# Patient Record
Sex: MALE | Race: WHITE
[De-identification: names, ages, dates, MRNs, and addresses within clinical notes are randomized per-mention and may not be internally consistent; named-entity substitution may affect disease eponyms.]

---

## 2017-06-02 ENCOUNTER — HOSPITAL ENCOUNTER (EMERGENCY)
Dept: HOSPITAL 68 - ERH | Age: 52
End: 2017-06-02
Payer: COMMERCIAL

## 2017-06-02 VITALS — BODY MASS INDEX: 27.09 KG/M2 | WEIGHT: 200 LBS | HEIGHT: 72 IN

## 2017-06-02 VITALS — DIASTOLIC BLOOD PRESSURE: 95 MMHG | SYSTOLIC BLOOD PRESSURE: 135 MMHG

## 2017-06-02 DIAGNOSIS — F10.129: Primary | ICD-10-CM

## 2017-06-02 DIAGNOSIS — K70.10: ICD-10-CM

## 2017-06-02 LAB
ABSOLUTE GRANULOCYTE CT: 5.4 /CUMM (ref 1.4–6.5)
BASOPHILS # BLD: 0 /CUMM (ref 0–0.2)
BASOPHILS NFR BLD: 0.5 % (ref 0–2)
EOSINOPHIL # BLD: 0.1 /CUMM (ref 0–0.7)
EOSINOPHIL NFR BLD: 0.9 % (ref 0–5)
ERYTHROCYTE [DISTWIDTH] IN BLOOD BY AUTOMATED COUNT: 13.3 % (ref 11.5–14.5)
GRANULOCYTES NFR BLD: 59.4 % (ref 42.2–75.2)
HCT VFR BLD CALC: 42.4 % (ref 42–52)
LITHIUM SERPL-SCNC: < 0.2 MMOL/L (ref 0.6–1.2)
LYMPHOCYTES # BLD: 2.8 /CUMM (ref 1.2–3.4)
MCH RBC QN AUTO: 34.6 PG (ref 27–31)
MCHC RBC AUTO-ENTMCNC: 34 G/DL (ref 33–37)
MCV RBC AUTO: 101.8 FL (ref 80–94)
MONOCYTES # BLD: 0.8 /CUMM (ref 0.1–0.6)
PLATELET # BLD: 215 /CUMM (ref 130–400)
PMV BLD AUTO: 9.1 FL (ref 7.4–10.4)
RED BLOOD CELL CT: 4.16 /CUMM (ref 4.7–6.1)
WBC # BLD AUTO: 9.1 /CUMM (ref 4.8–10.8)

## 2017-06-02 PROCEDURE — G0480 DRUG TEST DEF 1-7 CLASSES: HCPCS

## 2017-06-02 NOTE — ED PSYCHIATRIC COMPLAINT
History of Present Illness
 
General
Chief Complaint: ETOH/Drug Related Complaint
Stated Complaint: DETOX
Source: patient
Exam Limitations: no limitations
 
Vital Signs & Intake/Output
Vital Signs & Intake/Output
 Vital Signs
 
 
Date Time Temp Pulse Resp B/P B/P Pulse O2 O2 Flow FiO2
 
     Mean Ox Delivery Rate 
 
06/02 1735 96.7 90 18 122/89     
 
06/02 1730 96.7 90 18 122/89  96 Room Air  
 
06/02 1623 97.7 90 18 104/76  94 Room Air  
 
06/02 1403 97.6 88 18 124/76     
 
06/02 1158 96.6 93 18 155/81     
 
06/02 0948 98.0 69 18 124/74  99 Room Air  
 
 
Allergies
Coded Allergies:
NO KNOWN ALLERGIES (06/18/15)
 
Reconcile Medications
Amlodipine Besylate (Norvasc) 5 MG TABLET   1 TAB PO DAILY htn
Lorazepam (Ativan) 1 MG TABLET   1 TAB PO TID PRN ALCHOHOL WITHDRAWAL
     TODAY/TOMORROW: 1 TAB EVERY 6 HOURS
     DAY 3: 1 EVERY 8 HOURS
     DAY 4: 1 EVERY 12 HOURS
     DAY 5: 1 TAB
 
Triage Note:
PT TO ED FOR ETOH DETOX, REPORTING LAST DRINK WAS
1/2 PINT OF WHISKEY AT APPROX 8 AM, ENDRORSES
DRINKING 1-1.5 PINTS OF MARCELL DAILY FOR "ALL MY
LIFE" DENIES HX OF DETOX SEIZURES. PT APPEAR
INTOXICATED IN TRIAGE.
Triage Nurses Notes Reviewed? yes
HPI:
Patient presents for evaluation of alcoholism.  Patient states "I am drunk" with
his last alcohol consumption being 7:30 this morning consisting of 3 beers and a
pint of whiskey.  He states he drinks about 6 beers and 1-1-1/2 pints daily and 
has been "drinking all" his life.  He had one prior detox 2 years ago at 
different hospital.  he denies a history of seizures.
 
Past History
 
Travel History
Traveled to [place] past 21 day No
 
Medical History
Any Pertinent Medical History? see below for history
Neurological: NONE
EENT: NONE
Cardiovascular: NONE
Respiratory: NONE
Gastrointestinal: NONE
Hepatic: NONE
Renal: NONE
Musculoskeletal: NONE
Psychiatric: NONE
Endocrine: NONE
Blood Disorders: NONE
Cancer(s): NONE
GYN/Reproductive: NONE
History of MRSA: No
History of VRE: No
History of CDIFF: No
 
Surgical History
Surgical History: non-contributory
 
Psychosocial History
Who do you live with Other (see notes)
Services at Home None
What is your primary language English
Tobacco Use: Current Daily Use
Daily Tobacco Use Amount/Type: => 5 Cigarettes daily
ETOH Use: alcoholic
Illicit Drug Use: denies illicit drug use
 
Family History
Hx Contributory? No
 
Review of Systems
 
Review of Systems
Constitutional:
Reports: no symptoms. 
EENTM:
Reports: no symptoms. 
Respiratory:
Reports: no symptoms. 
Cardiovascular:
Reports: no symptoms. 
GI:
Reports: no symptoms. 
Genitourinary:
Reports: no symptoms. 
Musculoskeletal:
Reports: no symptoms. 
Skin:
Reports: no symptoms. 
Neurological/Psychological:
Reports: no symptoms. 
Hematologic/Endocrine:
Reports: no symptoms. 
Immunologic/Allergic:
Reports: no symptoms. 
All Other Systems: Reviewed and Negative
 
Physical Exam
 
Physical Exam
General Appearance: SEE BELOW
Neurological/Psychiatric: SEE BELOW
Comments:
General: Alert, calm, cooperative, mild EtOH-like odor
Head: Normocephalic, atraumatic
Eyes: Normal inspection, extinguishing nystagmus with lateral gaze, EOMI
Ears: Normal inspection
Nose: Normal inspection
Throat: Moist mucosa
Neck: Supple, no goiter
Heart: Regular rate and rhythm, no murmurs rubs or gallops
Lungs: Clear to auscultation bilaterally with good air entry
Abdomen: Soft nontender nondistended, normal bowel sounds
Chest: Nontender
Extremities: Normal range of motion grossly, no tremors present, no cyanosis 
clubbing or edema of the upper extremities
Neurologic: cranial nerves II through XII grossly intact, speech clear, gait 
normal
Psychiatric: No apparent delusions or hallucinations, no pressured speech or 
thought blocking
SAD PERSONS Done? patient not suicidal
 
Progress
Differential Diagnosis: drug intoxication, drug overdose, drug withdrawal
Plan of Care:
 Orders
 
 
Procedure Date/time Status
 
Add-on Test (ER Only) 06/02 1232 Active
 
LIPASE 06/02 1109 Complete
 
CIWA 06/02 1048 Active
 
Add-on Test (ER Only) 06/02 1046 Active
 
URINE DRUG SCREEN FOR ER ONLY 06/02 1021 Complete
 
MAGNESIUM 06/02 1021 Complete
 
LITHIUM 06/02 1021 Complete
 
ETHANOL 06/02 1021 Complete
 
COMPREHENSIVE METABOLIC PANEL 06/02 1021 Complete
 
CBC WITHOUT DIFFERENTIAL 06/02 1021 Complete
 
 
 Laboratory Tests
 
 
 
06/02/17 1109:
Lithium < 0.2  L, Serum Alcohol 251.0
 
06/02/17 1109:
Anion Gap 12, Estimated GFR > 60, BUN/Creatinine Ratio 17.1, Glucose 331  H, 
Calcium 8.5, Magnesium 1.7, Total Bilirubin 1.1, AST 76  H,   H, Alkaline
Phosphatase 184  H, Total Protein 6.7, Albumin 3.7, Globulin 3.0, Albumin/
Globulin Ratio 1.2, Lipase 133, CBC w Diff NO MAN DIFF REQ, RBC 4.16  L, MCV 
101.8  H, MCH 34.6  H, RDW 13.3, MPV 9.1, Gran % 59.4, Lymphocytes % 30.6, 
Monocytes % 8.6, Eosinophils % 0.9, Basophils % 0.5, Absolute Granulocytes 5.4, 
Absolute Lymphocytes 2.8, Absolute Monocytes 0.8  H, Absolute Eosinophils 0.1, 
Absolute Basophils 0, PUBS MCHC 34.0, Urine Opiates Screen < 100.00, Methadone 
Screen < 40, Barbiturate Screen < 60, Ur Phencyclidine Scrn < 6.00, Amphetamines
Screen < 100, U Benzodiazepines Scrn < 85, Urine Cocaine Screen < 50, Urine 
Cannabis Screen < 5.00
Comments:
6/2/2017 6:16:11 PM patient appears comfortable with low CIWA scores.  He is 
still somewhat intoxicated and cannot be formally evaluated for inpatient detox 
for another 2 hours or so.  I've explained this to the patient and he is 
respectfully declined further emergency Department treatment.  His on we'll be 
giving him a ride home and he will seek outpatient detox and counseling.  
Clinically this patient appears sober and capable of informed medical decision 
making.
 
Departure
 
Departure
Disposition: HOME OR SELF CARE
Condition: Stable
Clinical Impression
Primary Impression: Alcohol intoxication
Qualifiers:  Complication of substance-induced condition: uncomplicated 
Qualified Code: F10.920 - Alcohol use, unspecified with intoxication, 
uncomplicated
Secondary Impressions: 
Alcoholic hepatitis
Qualifiers:  Ascites presence: without ascites Qualified Code: K70.10 - 
Alcoholic hepatitis without ascites
 
Referrals:
ROSARIO VALENTIN,CONRADO (PCP/Family)
 
Additional Instructions:
Ativan as prescribed if you remain abstinent from alcohol.  Otherwise wean your 
alcohol use and seek an outpatient detox program as soon as possible.  Your 
primary care doctor of this emergency department visit and treatment plan.  Of 
note, you have elevations in your liver enzymes studies consistent with 
alcoholic hepatitis.  If you've refrain from alcohol intake this will resolve on
its own.  Return if any concerns or sudden worsening.
 
How to take the Ativan: you will take one tablet every 6 hours for the remainder
of today and tomorrow.  On third day you will take one tablet every 8 hours, on 
the fourth day you'll take one tablet every 12 hours and on the final day 1 
tablets only.
Departure Forms:
Customer Survey
General Discharge Information
Prescriptions:
Current Visit Scripts
Lorazepam (Ativan) 1 TAB PO TID PRN ALCHOHOL WITHDRAWAL 
     #12 TAB 
     TODAY/TOMORROW: 1 TAB EVERY 6 HOURS
     DAY 3: 1 EVERY 8 HOURS
     DAY 4: 1 EVERY 12 HOURS
     DAY 5: 1 TAB

## 2018-05-11 ENCOUNTER — HOSPITAL ENCOUNTER (INPATIENT)
Dept: HOSPITAL 68 - ERH | Age: 53
LOS: 5 days | DRG: 754 | End: 2018-05-16
Attending: PSYCHIATRY & NEUROLOGY | Admitting: PSYCHIATRY & NEUROLOGY
Payer: COMMERCIAL

## 2018-05-11 VITALS — WEIGHT: 238.25 LBS | HEIGHT: 72 IN | BODY MASS INDEX: 32.27 KG/M2

## 2018-05-11 VITALS — DIASTOLIC BLOOD PRESSURE: 104 MMHG | SYSTOLIC BLOOD PRESSURE: 156 MMHG

## 2018-05-11 VITALS — DIASTOLIC BLOOD PRESSURE: 80 MMHG | SYSTOLIC BLOOD PRESSURE: 141 MMHG

## 2018-05-11 DIAGNOSIS — F32.9: Primary | ICD-10-CM

## 2018-05-11 DIAGNOSIS — E11.8: ICD-10-CM

## 2018-05-11 DIAGNOSIS — F10.10: ICD-10-CM

## 2018-05-11 DIAGNOSIS — E78.5: ICD-10-CM

## 2018-05-11 DIAGNOSIS — I10: ICD-10-CM

## 2018-05-11 LAB
ABSOLUTE GRANULOCYTE CT: 3.6 /CUMM (ref 1.4–6.5)
BASOPHILS # BLD: 0.1 /CUMM (ref 0–0.2)
BASOPHILS NFR BLD: 0.7 % (ref 0–2)
EOSINOPHIL # BLD: 0.3 /CUMM (ref 0–0.7)
EOSINOPHIL NFR BLD: 3.4 % (ref 0–5)
ERYTHROCYTE [DISTWIDTH] IN BLOOD BY AUTOMATED COUNT: 14.1 % (ref 11.5–14.5)
GRANULOCYTES NFR BLD: 44.9 % (ref 42.2–75.2)
HCT VFR BLD CALC: 46 % (ref 42–52)
LYMPHOCYTES # BLD: 3.3 /CUMM (ref 1.2–3.4)
MCH RBC QN AUTO: 34.3 PG (ref 27–31)
MCHC RBC AUTO-ENTMCNC: 33.3 G/DL (ref 33–37)
MCV RBC AUTO: 103 FL (ref 80–94)
MONOCYTES # BLD: 0.8 /CUMM (ref 0.1–0.6)
PLATELET # BLD: 383 /CUMM (ref 130–400)
PMV BLD AUTO: 8.3 FL (ref 7.4–10.4)
RED BLOOD CELL CT: 4.47 /CUMM (ref 4.7–6.1)
WBC # BLD AUTO: 8.1 /CUMM (ref 4.8–10.8)

## 2018-05-11 PROCEDURE — G0463 HOSPITAL OUTPT CLINIC VISIT: HCPCS

## 2018-05-11 PROCEDURE — G0480 DRUG TEST DEF 1-7 CLASSES: HCPCS
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
## 2018-05-11 NOTE — ED PSYCHIATRIC COMPLAINT
History of Present Illness
 
General
Chief Complaint: Psychiatric Related Complaint
Stated Complaint: BIBA +SI
Source: patient
Exam Limitations: poor historian
Allergies
Coded Allergies:
NO KNOWN ALLERGIES (06/18/15)
 
Triage Note:
BIBA ON PEER AFTER TEXTING HIS DAUGHTER THAT HE
 WANTED TO HARM HIMSELF. SHE CALLED 911, AND ON
 ARRIVAL OF PD AND EMS THEY FOUND A LADDER LEANING
 AGAINST THE RAFTERS AND PT ADMITTED TO LOOKING FOR
 A ROPE. DENIES SIGNIFICANT ILLICIT DRUG USE, HX
 ETOH. ARRIVES CALM, COOPERATIVE AND OPEN TO HELP
Triage Nurses Notes Reviewed? yes
Onset: Abrupt
Duration: unknown duration
Timing: recent history
HPI:
53-year-old male was brought into the emergency room by police to [facility] 
after making suicidal comments to his daughter.  Patient was reportedly found 
with a ladder next to the garage and he was actively looking for a rope to hang 
himself.  He reports that he's been increasingly depressed and is currently in 
financial debt.  He reports that he has and insurance policy and is worth more 
tender than alive.  He does not want to live.  He reports that he just wants to 
leave here so he get about his business he denies any alcohol or drug use.  He 
lives with his aunt.
(Reece RAPP,Tamir)
Reconcile Medications
Atorvastatin Calcium 10 MG TABLET   1 TAB PO DAILY CHOLESTEROL  (Reported)
Lisinopril 5 MG TABLET   1 TAB PO DAILY HEART  (Reported)
Metformin HCl 500 MG TABLET   2 TAB PO BID DIABETES  (Reported)
 
(Arslan VALENTIN,Trey RUBIO)
 
Vital Signs & Intake/Output
Vital Signs & Intake/Output
 Vital Signs
 
 
Date Time Temp Pulse Resp B/P B/P Pulse O2 O2 Flow FiO2
 
     Mean Ox Delivery Rate 
 
05/13 1552  91  140/93     
 
05/13 1550  91  140/93     
 
05/13 1230  90  141/97     
 
05/13 1158  90  141/97     
 
05/13 0851  84  139/98     
 
05/13 0751  84  139/98     
 
05/13 0750 96.8 84  139/98     
 
05/12 2159  86  154/98     
 
05/12 2013 97.8 98  143/97     
 
05/12 2007 97.8 98  143/97     
 
 
 ED Intake and Output
 
 
 05/13 0000 05/12 1200
 
Intake Total  
 
Output Total  
 
Balance  
 
   
 
Patient  238 lb
 
Weight  
 
 
 
(Jennifer VALENTIN,Luis ARMSTRONG)
 
Past History
 
Travel History
Traveled to [place] past 21 day No
 
Medical History
Any Pertinent Medical History? see below for history
Neurological: NONE
EENT: NONE
Cardiovascular: NONE
Respiratory: NONE
Gastrointestinal: NONE
Hepatic: NONE
Renal: NONE
Musculoskeletal: NONE
Psychiatric: NONE
Endocrine: NONE
Blood Disorders: NONE
Cancer(s): NONE
GYN/Reproductive: NONE
History of MRSA: No
History of VRE: No
History of CDIFF: No
 
Surgical History
Surgical History: non-contributory
 
Psychosocial History
Who do you live with Other (see notes)
Services at Home None
What is your primary language English
Tobacco Use: Current Daily Use
Daily Tobacco Use Amount/Type: => 5 Cigarettes daily
 
Family History
Hx Contributory? No
(Tamir Magdaleno)
 
Review of Systems
 
Review of Systems
Constitutional:
Reports: no symptoms. 
EENTM:
Reports: no symptoms. 
Respiratory:
Reports: no symptoms. 
Cardiovascular:
Reports: no symptoms. 
GI:
Reports: no symptoms. 
Genitourinary:
Reports: no symptoms. 
Musculoskeletal:
Reports: no symptoms. 
Skin:
Reports: no symptoms. 
Neurological/Psychological:
Reports: see HPI. 
Hematologic/Endocrine:
Reports: no symptoms. 
Immunologic/Allergic:
Reports: no symptoms. 
All Other Systems: Reviewed and Negative
(Tamir Magdaleno)
 
Physical Exam
 
Physical Exam
General Appearance: well developed/nourished, alert, awake
Head: atraumatic
Eyes:
Bilateral: normal appearance. 
Ears, Nose, Throat: normal ENT inspection, hearing grossly normal
Neck: normal inspection
Respiratory: no respiratory distress
Cardiovascular: regular rate/rhythm
Extremities: normal range of motion
Neurological/Psychiatric: alert, calm, flat
Appearance/Memory/Insight: impaired insight
Behavoir/Eye Contact/Speech: cooperative
Thoughts/Hallucinations: no apparent hallucination
Skin: intact, normal color, warm/dry
SAD PERSONS
 
 
SAD PERSONS Response Value
 
Male Sex? yes 1
 
Age <19 or >45 years? yes 1
 
Depression/Hopelessness? yes 2
 
Rational Thinking Loss? yes 2
 
Single//? yes 1
 
Organized/Serious Attempt yes 2
 
Stated Future Intent? yes 2
 
Total   11
 
 
SAD PERSONS Done? yes
(Tamir Magdaleno)
 
Progress
Differential Diagnosis: dementia, drug intoxication, drug overdose, drug 
withdrawal, depression, anxiety, bipolar,
Plan of Care:
 Current Medications
 
 
  Sig/Samia Start time  Last
 
Medication Dose  Stop Time Status Admin
 
Lisinopril 10 MG DAILY 05/13 0900 AC 05/13
 
(Prinivil)     0851
 
Lorazepam 1 MG Q4P PRN 05/13 0845 AC 
 
(Ativan)     
 
Lorazepam 2 MG Q4P PRN 05/13 0845 AC 
 
(Ativan)     
 
Insulin Aspart 0 TIDAC 05/12 1700 AC 
 
(NovoLOG)     
 
Metformin HCl 1,000 MG 0800,1700 05/12 1700 AC 05/13
 
(Glucophage)     1722
 
Acetaminophen 650 MG Q4P PRN 05/12 1230 AC 
 
(Tylenol)     
 
Al Hydroxide/Mg  30 ML Q4-6 PRN PRN 05/12 1230 AC 
 
Hydroxide     
 
(Maalox Plus)     
 
Magnesium Hydroxide 30 ML AT BEDTIME PRN 05/12 1230 AC 
 
(Milk Of Magnesia)     
 
Aspirin Buffered 81 MG DAILY 05/12 1220 AC 05/13
 
(Ecotrin)     0850
 
Multivitamins 1 TAB DAILY 05/12 1213 AC 05/13
 
(Theragran Vitamins)     0851
 
Folic Acid 1 MG DAILY 05/12 1210 AC 05/13
 
(Folic Acid)     0850
 
Thiamine HCl 100 MG DAILY 05/12 1210 AC 05/13
 
(Vitamin B1)     0850
 
Atorvastatin Calcium 10 MG DAILY 05/12 0900 AC 05/13
 
(Lipitor)     0850
 
 
 
Hand-Off
   Endorsed To:
Arcelia Mccarthy MD
(Tamir Magdaleno)
Hand-Off
   Endorsed To:
Luis Rodriguez MD
   Endorsed Time: 0700
   Pending: consult
(Arslan VALENTIN,Trey RUBIO)
Comments:
5/12/2018 7:26:32 AM patient signed out to me by Dr. Mcdaniels at shift change 
over.
(Jennifer VALENTIN,Luis ARMSTRONG)
 
Departure
 
Departure
Condition: Stable
Referrals:
Brenden VALENTIN,Chilo FLORES (PCP/Family)
 
Departure Forms:
Customer Survey
General Discharge Information
Comments
5/11/2018 1:17:06 PM
 
I was just made aware by the charge nurse that the patient reportedly eloped 
from the emergency room through the ambulance bay.  Police were notified and are
looking for the patient.
(Tamir Magdaleno)
 
Departure
Disposition: STILL A PATIENT
 
PA/NP Co-Sign Statement
Statement:
ED Attending supervision documentation-
 
[] I saw and evaluated the patient. I have also reviewed all the pertinent lab 
results and diagnostic results. I agree with the findings and the plan of care 
as documented in the PA's/NP's documentation. 
 
[x] I have reviewed the ED Record and agree with the PA's/NP's documentation.
 
[] Additions or exceptions (if any) to the PAs/NP's note and plan are 
summarized below:
[]
 
(Trey Mcdaniels MD)
 
Departure
Clinical Impression
Primary Impression: Depression
Qualifiers:  Depression Type: unspecified Qualified Code: F32.9 - Major 
depressive disorder, single episode, unspecified
Secondary Impressions: Alcohol use disorder
 
Psych Admission Note
Psychiatric Admission:
I have seen and evaluated LUIS EDUARDO MARTIN.
 
I have also reviewed all the pertinent lab results and diagnostic results.
 
LUIS EDUARDO MARTIN will be admitted to our inpatient Psychiatric unit for 
treatment and care.
 
(Jennifer VALENTIN,Luis ARMSTRONG)
 
 
[] Additions or exceptions (if any) to the PAs/NP's note and plan are 
summarized below:
[]
 
(Trey Mcdaniels MD)
 
Departure
Clinical Impression
Primary Impression: Depression
Qualifiers:  Depression Type: unspecified Qualified Code: F32.9 - Major 
depressive disorder, single episode, unspecified
Secondary Impressions: Alcohol use disorder
 
Psych Admission Note
Psychiatric Admission:
I have seen and evaluated LUIS EDUARDO MARTIN.
 
I have also reviewed all the pertinent lab results and diagnostic results.
 
LUIS EDUARDO MARTIN will be admitted to our inpatient Psychiatric unit for 
treatment and care.
 
(Luis Rodriguez MD)

## 2018-05-12 VITALS — SYSTOLIC BLOOD PRESSURE: 164 MMHG | DIASTOLIC BLOOD PRESSURE: 96 MMHG

## 2018-05-12 VITALS — DIASTOLIC BLOOD PRESSURE: 93 MMHG | SYSTOLIC BLOOD PRESSURE: 155 MMHG

## 2018-05-12 VITALS — DIASTOLIC BLOOD PRESSURE: 98 MMHG | SYSTOLIC BLOOD PRESSURE: 154 MMHG

## 2018-05-12 VITALS — DIASTOLIC BLOOD PRESSURE: 102 MMHG | SYSTOLIC BLOOD PRESSURE: 148 MMHG

## 2018-05-12 VITALS — SYSTOLIC BLOOD PRESSURE: 136 MMHG | DIASTOLIC BLOOD PRESSURE: 87 MMHG

## 2018-05-12 VITALS — SYSTOLIC BLOOD PRESSURE: 144 MMHG | DIASTOLIC BLOOD PRESSURE: 84 MMHG

## 2018-05-12 VITALS — DIASTOLIC BLOOD PRESSURE: 97 MMHG | SYSTOLIC BLOOD PRESSURE: 143 MMHG

## 2018-05-12 VITALS — SYSTOLIC BLOOD PRESSURE: 155 MMHG | DIASTOLIC BLOOD PRESSURE: 93 MMHG

## 2018-05-12 VITALS — DIASTOLIC BLOOD PRESSURE: 79 MMHG | SYSTOLIC BLOOD PRESSURE: 150 MMHG

## 2018-05-12 VITALS — SYSTOLIC BLOOD PRESSURE: 136 MMHG | DIASTOLIC BLOOD PRESSURE: 80 MMHG

## 2018-05-12 VITALS — SYSTOLIC BLOOD PRESSURE: 154 MMHG | DIASTOLIC BLOOD PRESSURE: 76 MMHG

## 2018-05-12 VITALS — DIASTOLIC BLOOD PRESSURE: 89 MMHG | SYSTOLIC BLOOD PRESSURE: 147 MMHG

## 2018-05-12 VITALS — SYSTOLIC BLOOD PRESSURE: 143 MMHG | DIASTOLIC BLOOD PRESSURE: 97 MMHG

## 2018-05-12 NOTE — HISTORY & PHYSICAL
General Information and HPI
MD Statement:
I have seen and personally examined LUIS EDUARDO MARTIN and documented this H&P.
 
The patient is a 53 year old M who presented with a patient stated chief 
complaint of making suicidal statements
 
Source of Information: patient
Exam Limitations: no limitations
History of Present Illness:
53-year-old male with past medical history significant for diabetes, 
hypertension, hyperlipidemia, depression who is admitted to Inpatient Psychiatry
because his daughter called the police.  Patient lives with his daughter and his
daughter lives in [place].  He was talking to his daughter over the phone and he 
says that he was drunk at that time.  He made statements that indicated the 
daughter that he might harm himself.  Patient states that currently he has lost 
his transport/car therefore he's not working.  That is making him somewhat 
depressed and he has been drinking on daily basis.  While he was drunk, his 
daughter called and he made those statements.  He currently denies those 
ideations.  He also denies any aches or pains, any urinary complaints, any 
trouble breathing.  He denies taking any medications for psychiatric issues.
 
Allergies/Medications
Allergies:
Coded Allergies:
NO KNOWN ALLERGIES (06/18/15)
 
Home Med list
Atorvastatin Calcium 10 MG TABLET   1 TAB PO DAILY CHOLESTEROL  (Reported)
Lisinopril 5 MG TABLET   1 TAB PO DAILY HEART  (Reported)
Metformin HCl 500 MG TABLET   2 TAB PO BID DIABETES  (Reported)
 
 
Past History
 
Travel History
Traveled to [place] past 21 day No
 
Medical History
Neurological: NONE
EENT: NONE
Cardiovascular: hypertension, hyperlipidemia
Respiratory: NONE
Gastrointestinal: NONE
Hepatic: NONE
Renal: NONE
Musculoskeletal: NONE
Psychiatric: alcohol dependence, depression
Endocrine: diabetes
Blood Disorders: NONE
Cancer(s): NONE
GYN/Reproductive: NONE
History of MRSA: No
History of VRE: No
History of CDIFF: No
Isolation History: Standard
 
Surgical History
Surgical History: non-contributory
 
Past Family/Social History
 
Family History
Relations & Conditions if any
Relation not specified for:
  *No pertinent family history
 
 
Psychosocial History
Where do you live? Home
Services at Home: None
 
Review of Systems
 
Review of Systems
Constitutional:
Reports: see HPI. 
EENTM:
Reports: see HPI. 
Cardiovascular:
Reports: see HPI. 
Respiratory:
Reports: see HPI. 
GI:
Reports: see HPI. 
Musculoskeletal:
Reports: see HPI. 
Neurological/Psychological:
Reports: see HPI. 
 
Exam & Diagnostic Data
Last 24 Hrs of Vital Signs/I&O
 Vital Signs
 
 
Date Time Temp Pulse Resp B/P B/P Pulse O2 O2 Flow FiO2
 
     Mean Ox Delivery Rate 
 
05/12 1622  91  148/102     
 
05/12 1622  91  148/102     
 
05/12 1407  98  147/89     
 
05/12 1222  93  155/93     
 
05/12 1153  67  136/87     
 
05/12 1040 97.5 93  155/93     
 
05/12 1040 97.5 93  155/93     
 
05/12 1027  98  136/80     
 
05/12 1018 98.4 98 16 136/80  95 Room Air  
 
05/12 1000 98.4 98 16 136/80     
 
05/12 0700 98.6 102 16 150/79     
 
05/12 0700 98.6 102 16 150/76  94 Room Air  
 
05/12 0518 98.4 99 18 144/84     
 
05/12 0518 98.4 99 18 144/84  96 Room Air  
 
05/12 0316 97.6 101 16 136/80     
 
05/12 0316 97.6 101 16 136/80  95 Room Air  
 
05/12 0101 96.7 104 16 154/76     
 
05/12 0101 98.1 104 16 154/76  95 Room Air  
 
05/11 2302 98.5 104 18 141/80     
 
05/11 2302 98.5 104 18 141/80  94 Room Air Room Air 
 
05/11 2056 98.7 109 18 156/104  96 Room Air Room Air 
 
05/11 2055 98.7 109 18 156/104     
 
05/11 1914 99.7 115 20 160/100  99 Room Air  
 
 
 Intake & Output
 
 
 05/12 1600 05/12 0800 05/12 0000
 
Intake Total   
 
Output Total   
 
Balance   
 
    
 
Patient 238 lb  
 
Weight   
 
 
 
 
Physical Exam
General Appearance Alert, Oriented X3, Cooperative, No Acute Distress
Skin No Rashes
HEENT PERRLA
Neck Supple
Cardiovascular Regular Rate, Normal S1, Normal S2
Lungs Clear to Auscultation
Abdomen Normal Bowel Sounds, Soft, No Tenderness
Neurological
   Cranial Nerves II through XII:
Intact
Extremities No Edema
Last 24 Hrs of Labs/Javon:
 Laboratory Tests
 
05/12/18 1620:
Sodium Pending, Potassium Pending, Chloride Pending, Carbon Dioxide Pending, 
Anion Gap Pending, BUN Pending, Creatinine Pending, BUN/Creatinine Ratio Pending
 
 Laboratory Tests
 
 
 05/12 05/11
 
 1620 1202
 
Chemistry  
 
  Sodium Pending 
 
  Potassium Pending 
 
  Chloride Pending 
 
  Carbon Dioxide Pending 
 
  Anion Gap Pending 
 
  BUN Pending 
 
  Creatinine Pending 
 
  BUN/Creatinine Ratio Pending 
 
Toxicology  
 
  Urine Opiates Screen (>2000 NG/ML)  < 100
 
  Methadone Screen (>300 NG/ML)  < 40
 
  Barbiturate Screen (>200 NG/ML)  < 60
 
  Ur Phencyclidine Scrn (>25 NG/ML)  < 6.00
 
  Amphetamines Screen (>1000 NG/ML)  239
 
  U Benzodiazepines Scrn (>200 NG/ML)  < 85
 
  Urine Cocaine Screen (>300 NG/ML)  < 50
 
  Urine Cannabis Screen (>50 NG/ML)  20.80
 
Urines  
 
  Urine Color (YEL,AMB,STR)  YEL
 
  Urine Clarity (CLEAR)  CLEAR
 
  Urine pH (5.0 - 8.0)  5.5
 
  Ur Specific Gravity (1.001 - 1.035)  >= 1.030
 
  Urine Protein (NEG,<30 MG/DL)  NEG
 
  Urine Ketones (NEG)  NEG
 
  Urine Nitrite (NEG)  NEG
 
  Urine Bilirubin (NEG)  NEG
 
  Urine Urobilinogen (0.1  -  1.0 EU/dl)  0.2
 
  Ur Leukocyte Esterase (NEG)  NEG
 
  Ur Microscopic  EXAM NOT REQUIRED
 
  Urine Hemoglobin (NEG)  NEG
 
  Urine Glucose (N MG/DL)  NEG
 
 
 
 
 05/11
 
 1055
 
Chemistry 
 
  Sodium (137 - 145 mmol/L) 150  H
 
  Potassium (3.5 - 5.1 mmol/L) 5.0
 
  Chloride (98 - 107 mmol/L) 112  H
 
  Carbon Dioxide (22 - 30 mmol/L) 21  L
 
  Anion Gap (5 - 16) 17  H
 
  BUN (9 - 20 mg/dL) 11
 
  Creatinine (0.7 - 1.2 mg/dL) 0.8
 
  Estimated GFR (>60 ml/min) > 60
 
  BUN/Creatinine Ratio (7 - 25 %) 13.8
 
  Glucose (65 - 99 mg/dL) 143  H
 
  Calcium (8.4 - 10.2 mg/dL) 10.2
 
  Total Bilirubin (0.2 - 1.3 mg/dL) 0.2
 
  AST (17 - 59 U/L) 79  H
 
  ALT (21 - 72 U/L) 193  H
 
  Alkaline Phosphatase (< 127 U/L) 79
 
  Total Protein (6.3 - 8.2 g/dL) 7.8
 
  Albumin (3.5 - 5.0 g/dL) 4.8
 
  Globulin (1.9 - 4.2 gm/dL) 3.0
 
  Albumin/Globulin Ratio (1.1 - 2.2 %) 1.6
 
Hematology 
 
  CBC w Diff NO MAN DIFF REQ
 
  WBC (4.8 - 10.8 /CUMM) 8.1
 
  RBC (4.70 - 6.10 /CUMM) 4.47  L
 
  Hgb (14.0 - 18.0 G/DL) 15.3
 
  Hct (42 - 52 %) 46.0
 
  MCV (80.0 - 94.0 FL) 103.0  H
 
  MCH (27.0 - 31.0 PG) 34.3  H
 
  MCHC (33.0 - 37.0 G/DL) 33.3
 
  RDW (11.5 - 14.5 %) 14.1
 
  Plt Count (130 - 400 /CUMM) 383
 
  MPV (7.4 - 10.4 FL) 8.3
 
  Gran % (42.2 - 75.2 %) 44.9
 
  Lymphocytes % (20.5 - 51.1 %) 40.9
 
  Monocytes % (1.7 - 9.3 %) 10.1  H
 
  Eosinophils % (0 - 5 %) 3.4
 
  Basophils % (0.0 - 2.0 %) 0.7
 
  Absolute Granulocytes (1.4 - 6.5 /CUMM) 3.6
 
  Absolute Lymphocytes (1.2 - 3.4 /CUMM) 3.3
 
  Absolute Monocytes (0.10 - 0.60 /CUMM) 0.8  H
 
  Absolute Eosinophils (0.0 - 0.7 /CUMM) 0.3
 
  Absolute Basophils (0.0 - 0.2 /CUMM) 0.1
 
Toxicology 
 
  Serum Alcohol (<10 MG/DL) 274.0
 
 
 
 
Assessment/Plan
Assessment:
53-year-old male with past history significant for diabetes, hypertension, 
hyperlipidemia, depression, alcohol dependence admitted to Inpatient Psychiatry 
with making some statements which indicated that he could harm himself.
For his diabetes patient on metformin.  I will add Accu-Cheks and sliding scale 
insulin.  Blood pressure is not under control, I will go up on the dose of his 
lisinopril.  Patient also was hyponatremic on admission.  I have ordered repeat 
lab work.  Patient on statin for his hyperlipidemia.  Patient has been started 
on Ativan for history of alcohol dependence.
The rest of the psychiatric management will be up to the psychiatrist.
 
As Ranked By This Provider
Problem List:
 1. Alcohol dependence
 
 2. Diabetes
 
 3. HTN (hypertension)
 
 4. Depression
   Qualifiers
 Depression Type: unspecified Qualified Code: F32.9 - Major depressive disorder,
single episode, unspecified
 
 
Miscellaneous
 
Miscellaneous Documentation
Attending Case Discussed With:
Linda Bueno M.D.
 
Primary Care Physician:
Chilo Wilcox MD
 
Patient sees these Specialists
None
Level of Patient Care: [facility]
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
## 2018-05-13 VITALS — DIASTOLIC BLOOD PRESSURE: 97 MMHG | SYSTOLIC BLOOD PRESSURE: 141 MMHG

## 2018-05-13 VITALS — DIASTOLIC BLOOD PRESSURE: 93 MMHG | SYSTOLIC BLOOD PRESSURE: 140 MMHG

## 2018-05-13 VITALS — SYSTOLIC BLOOD PRESSURE: 141 MMHG | DIASTOLIC BLOOD PRESSURE: 97 MMHG

## 2018-05-13 VITALS — SYSTOLIC BLOOD PRESSURE: 139 MMHG | DIASTOLIC BLOOD PRESSURE: 98 MMHG

## 2018-05-13 VITALS — SYSTOLIC BLOOD PRESSURE: 139 MMHG | DIASTOLIC BLOOD PRESSURE: 83 MMHG

## 2018-05-13 VITALS — SYSTOLIC BLOOD PRESSURE: 140 MMHG | DIASTOLIC BLOOD PRESSURE: 93 MMHG
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
## 2018-05-14 VITALS — SYSTOLIC BLOOD PRESSURE: 169 MMHG | DIASTOLIC BLOOD PRESSURE: 71 MMHG

## 2018-05-14 VITALS — SYSTOLIC BLOOD PRESSURE: 143 MMHG | DIASTOLIC BLOOD PRESSURE: 80 MMHG

## 2018-05-14 VITALS — SYSTOLIC BLOOD PRESSURE: 140 MMHG | DIASTOLIC BLOOD PRESSURE: 89 MMHG

## 2018-05-14 VITALS — DIASTOLIC BLOOD PRESSURE: 89 MMHG | SYSTOLIC BLOOD PRESSURE: 140 MMHG

## 2018-05-14 VITALS — SYSTOLIC BLOOD PRESSURE: 157 MMHG | DIASTOLIC BLOOD PRESSURE: 90 MMHG

## 2018-05-14 VITALS — DIASTOLIC BLOOD PRESSURE: 90 MMHG | SYSTOLIC BLOOD PRESSURE: 157 MMHG

## 2018-05-14 VITALS — DIASTOLIC BLOOD PRESSURE: 80 MMHG | SYSTOLIC BLOOD PRESSURE: 143 MMHG

## 2018-05-14 NOTE — SOCIAL WORKER PROG NOTE PSYCH
Social Work Progress Note
Progress Note
This writer met with patient.  He identified the reason for admission as "I got 
incredibly drunk last week."  Patient denied SI and stated, "I would never harm 
myself."  Patient stated that he lives with his aunt and would be willing to 
have a family meeting with her (FRANCESCA signed).  He also signed an FRANCESCA for his 
daughter, Naomi Negrete.  Patient stated that he completed [facility] IOP last year 
and was attending [facility].  Patient stated that he had been unable to attend the 
relapse prevention group and AA due to transportation issues.  He was informed 
of [facility], which he was not previously aware of.  Patient deneid any other 
substance use other than alcohol.  He denied HI/AH/VH.  
 
This writer spoke with patient's autn, Lyudmila Oseguera ([phone]), by phone 
regarding the family meeting.  This writer and TOMASA Oseguera will speak tomorrow 
morning to schedule a meeting as TOMASA Oseguera has appointments in the morning.

## 2018-05-14 NOTE — CP SOUTH PROGRESS NOTE PSYCH
Psych (Inpt) Progress Note
Progress Note
Mental Status Examination
Ambulation Status:
steady gait
Appearance:
unremarkable
Attitude towards examiner:
calm and cooperative
Psychomotor activity:
normal
Behavior:
no abnormalities
Quality of speech:
normal
Affect:
good range of afffect
Mood:
denied feeling depressed
Suicidal Ideation:
denied
Homicidal Ideation:
denied
Hallucinations:
denied
Paranoid/Delusional Material:
denied, there were no delusions
Difficulties with thought organization:
none
Insight:
partial
Judgment:
depends on sobriety
Orientation:
alert and oriented
Cognition:
no difficulties noted
Memory Function:
no impairments
Estimate of intellectual functioning:
average
 
Assets/Strengths
Patient Identified Assets/Strengths:
intelligent, supportive family
 
Impression/Plan
Impression and Plan:
53-year-old male admitted after talking and texting daughter (while intoxicated)
and mentioned suicide
- Include all active medical diagnosis that require tx
DSM 5 Diagnosis(es):
unspecified depressive disorder, most likely alcohol induced
Alcohol Use Disorder
- Initial Tx Plan for Active Psych & Medical Conditions
Treatment Plan:
inpatient psych, safety checks
alcohol detoxification with Ativan
- Factors that would help patient function
- in a less restrictive setting.
Factors:
will be discharged once free of suicide thoughts x 2 consecutive days
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
## 2018-05-15 VITALS — DIASTOLIC BLOOD PRESSURE: 78 MMHG | SYSTOLIC BLOOD PRESSURE: 133 MMHG

## 2018-05-15 VITALS — SYSTOLIC BLOOD PRESSURE: 138 MMHG | DIASTOLIC BLOOD PRESSURE: 92 MMHG

## 2018-05-15 VITALS — SYSTOLIC BLOOD PRESSURE: 139 MMHG | DIASTOLIC BLOOD PRESSURE: 79 MMHG

## 2018-05-15 VITALS — SYSTOLIC BLOOD PRESSURE: 142 MMHG | DIASTOLIC BLOOD PRESSURE: 75 MMHG

## 2018-05-15 VITALS — DIASTOLIC BLOOD PRESSURE: 72 MMHG | SYSTOLIC BLOOD PRESSURE: 142 MMHG

## 2018-05-15 NOTE — SOCIAL WORKER PROG NOTE PSYCH
Social Work Progress Note
Progress Note
LUIS EDUARDO MARTIN
[id number]
1965
LUIS EDUARDO MARTIN 
[id number]
 
Pended Authorization #
Client Authorization #
Type of Request
 
[id number]
[id number]
CONCURRENT
 
 
Date of Admission/ Start of Services
Requested From
Submission Date
   
05/12/2018
05/15/2018
05/15/2018
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
## 2018-05-15 NOTE — SOCIAL WORKER PROG NOTE PSYCH
Social Work Progress Note
Progress Note
Dr. Tolliver and this writer met with the patient, his aunt (Lyudmila) and his daugher
, Ruchi.  Ruchi attended the meeting by phone as she lives in [place].  
Naomi recalled that she has never known her father not to drink alcohol.  
Regarding her decision to call 911 prior to this admission, she stated, "I've 
never felt that I've needed to call anyone to check on him."  She reported 
feeling concerned about his safety due to receiving suicidal text messages.  
Patient is not agreeable to medication or referrals to inpatient rehab programs.
 
 
Upon Dr. Tolliver's departure from the meeting, this writer discussed discharge 
plans.  Patient was agreeable to returning to [facility], which he had attended in 
the past.  This writer will provide the patient with the [facility] number in order to
avoid future transportation issues and treatment absences.  While he refused 
rehab referrals, he agreed to inform this writer or whomever his counselor is in
the future should he wish to be referred to rehab.  Patient's aunt and daughter 
both denied any current safety concerns and feel comfortable with him 
discharging to [facility].
 
Patient is scheduled for an IOP intake with [facility] on 5/16/18 at 11:30am.

## 2018-05-16 VITALS — DIASTOLIC BLOOD PRESSURE: 92 MMHG | SYSTOLIC BLOOD PRESSURE: 163 MMHG

## 2018-05-16 NOTE — DISCHARGE SUMMARY REPORT-PSYCH
Visit Information
 
Visit Dates/Diagnosis'
Admission Date:
05/12/18
 
Discharge Date: 05/16/18
Reason for Admission:
While intoxicated, expressed suicidal ideation and
reportedly was preparing to hang self.
Psy Discharge Primary Diag: Unspecified depression
Psy Discharge Secondary Diag: Alcohol use disorder Diabetes Hypertension 
Hyperlipidemia
 
Hospital Course
Significant Lab Findings:
Lab
 
 
 U/L H 05/11/18 1055
 
AST 79 U/L H 05/11/18 1055
 
BUN 14 mg/dL 05/12/18 1620
 
Calcium 10.2 mg/dL 05/11/18 1055
 
Carbon Dioxide 27 mmol/L 05/12/18 1620
 
Chloride 102 mmol/L 05/12/18 1620
 
Cholesterol 158 MG/DL 05/12/18 1620
 
Cholesterol/HDL Ratio 4 % 05/12/18 1620
 
Creatinine 0.7 mg/dL 05/12/18 1620
 
Estimated GFR > 60 ml/min 05/12/18 1620
 
Glucose 143 mg/dL H 05/11/18 1055
 
HDL Cholesterol 36 mg/dL L 05/12/18 1620
 
LDL Cholesterol, Calc 73 mg/dL 05/12/18 1620
 
Potassium 4.3 mmol/L 05/12/18 1620
 
Sodium 141 mmol/L 05/12/18 1620
 
TSH &T3 &Free T4 Intrp 0.686 uIU/mL 05/12/18 1620
 
Total Bilirubin 0.2 mg/dL 05/11/18 1055
 
Triglycerides 248 mg/dL H 05/12/18 1620
 
Absolute Monocytes 0.8 /CUMM H 05/11/18 1055
 
Hct 46.0 % 05/11/18 1055
 
Hgb 15.3 G/DL 05/11/18 1055
 
MCH 34.3 PG H 05/11/18 1055
 
.0 FL H 05/11/18 1055
 
Monocytes % 10.1 % H 05/11/18 1055
 
Plt Count 383 /CUMM 05/11/18 1055
 
RBC 4.47 /CUMM L 05/11/18 1055
 
WBC 8.1 /CUMM 05/11/18 1055
 
Serum Alcohol 274.0 MG/DL 05/11/18 1055
 
 
EKG 5/12/18 showed sinus rhythm @ 84, probable inferior infarct, old, no change 
from prior tracing, abnormal EKG, , QTc 459.  At time of discharge, 
patient was provided with copy of EKG.
 
Course
Complications:
None.
Consultations:
The patient was seen by Dr. Thao for admission H&P.  Per her note of 5/12/18:
"53-year-old male with past history significant for diabetes, hypertension, 
hyperlipidemia, depression, alcohol dependence admitted to Inpatient Psychiatry 
with making some statements which indicated that he could harm himself.
For his diabetes patient on metformin.  I will add Accu-Cheks and sliding scale 
insulin.  Blood pressure is not under control, I will go up on the dose of his 
lisinopril.  Patient also was hyponatremic [sic] on admission.  I have ordered 
repeat lab work.  Patient on statin for his hyperlipidemia.  Patient has been 
started on Ativan for history of alcohol dependence.
The rest of the psychiatric management will be up to the psychiatrist."
 
CORRECTION: The patient was HYPERnatremia on admission but this corrected (MARCIE).
 
Allergies:
Coded Allergies:
NO KNOWN ALLERGIES (06/18/15)
 
Hospital Course/TX Response:
The patient was monitored on the unit for safety, alcohol withdrawal and mood 
disorder.  He participated in multi-modal treatments on the unit.  An anti-
depressant was not indicated.  The patient was offered a choice of Antabuse, 
Campral or Revia, and he was not interested in any of these.
 
Mood and affect have improved.  Detox was uncomplicated.  Suicidal ideation has 
remitted.
 
Progress note from date of discharge, 5/16/18:
"Case and treatment plan discussed in team meeting.  Staff reports that the 
patient is denying suicidal ideation.  Seems fine.  Has IOP intake at 11:30 AM.
 
Patient seen at 9:57 AM.  Reports he feels pretty good.  Has no complaints.  Not
interested in medication.  Affect is calm and blunted to euthymic.  Mood is 
good.  Rates sad mood and anxiety both 0/10.  Denies feeling hopeless, helpless,
worthless or guilty.  Denies active and passive suicidal ideation.  Denies 
homicidal ideation.  Denies auditory and visual hallucinations and paranoid 
ideation.  Describes sleep, appetite and energy as good.  Tolerating medications
well, without complaint.  Feels ready and safe for discharge.  Patient was 
advised to remain clean and sober.
 
IMPRESSION:
Condition improved.  Okay for discharge today to IOP intake, then to home and 
aunt."
 
 
Discharge HBIPS
- Tobacco Use Treatment Offered
Post DC Medications Offered: Script Given-See Med List
Post DC Tobacco Treatment Plan: [facility] Tobacco Tx Pgm
Program Appt Date: 05/16/18
Program Appt Time: 1600
- EtOH/Drug Use D/O Treatment Offered
Post DC Medications Offered: Ref Med EtOH/Drug Use D/O
Post DC EtOH/SubAbuse [place] Plan: [facility] SubAbuse/Dual IOP
Program Appt Date: 05/16/18
Program Appt Time: 1130
 
Metabolic Screening
- Screen if on a Neuroleptic Medication
- Metabolic screening should include:
- Blood Pressure, BMI, Glucose or Hgb A1c, & a
- Lipid profile from within the past 365 days.
Metabolic Screening
([x]) Not Applicable, patient not on a neuroleptic.
 
 OR
 
() Patient on a neuroleptic(s) .
     Enter below results for Hemoglobin A1C, 
     and lipid panel if obtained during the last 365 days.
 
BMI: 32.300     
 
Blood Pressure: 163/92
 
Laboratory Results From [facility] EHR (If applicable):
 
 
 
Discharge Instructions
 
General Discharge Information
Multiple Neuroleptics:
([s]) Not Applicable
      
     OR
   
 Document below three failed attempts at monotherapy, or a plan to taper to 
 monotherapy, or augmentation of Clozapine.
 ()
 
Discharge Diet Diabetic
Discharge Activity Normal
DC Disposition:
Returning to home and aunt.
Referrals
Ordered Referrals
Provider Referral 05/16/18
For Providers:
[[facility]]
 
For Groups:
[Intensive Outpatient]
 
[facility]  
[address], [place]  
[phone]  
  
Intake: Wednesday, 5/16/18, at 11:30am  
  
**[facility] (medical cab transportation):   
[phone]
 
Provider Referral 05/16/18
For Providers:
[[facility]]
 
For Groups:
[Smoking Cessation Group]
 
Smoking Cessation Group  
[facility]  
[address], [place]  
[phone]  
  
Group meets every other Wednesday at 4pm  
  
**Next Group: 5/16/18 at 4pm
 
 
 
Prescriptions
Stop taking the following medications:
Lisinopril (Lisinopril) 5 MG TABLET ORAL DAILY Qty = 90
 
Continue taking these medications:
Atorvastatin Calcium (Atorvastatin Calcium) 10 MG TABLET
    1 Tablet ORAL DAILY
    Qty = 30
    Comments:
       Last Taken:5/16/18
             Time:8:30AM
 
Metformin HCl (Metformin HCl) 500 MG TABLET
    2 Tablet ORAL TWICE DAILY
    Comments:
       Last Taken:5/16/18
             Time:8:30AM
 
Aspirin (Aspirin*) 81 MG TAB.CHEW
    1 Tablet ORAL DAILY
    Comments:
       Last Taken:5/16/18
             Time:8:30AM
 
Start taking the following new medications:
Nicotine (Nicorelief) 2 MG GUM
    1 Gum ORAL EVERY 2 HOURS AS NEEDED as needed for nicotine craving
    Qty = 100
    No Refills
    Comments:
       Last Taken:NOT USED IN THE HOSPITAL
             Time:
 
Lisinopril (Lisinopril) 10 MG TABLET
    1 Tablet ORAL DAILY
    Qty = 14
    No Refills
    Comments:
       Last Taken:5/16/18
             Time:8:30AM
 
Multivitamin (One Daily Multivitamin) 1 EACH TABLET
    1 Tablet ORAL DAILY
    Qty = 14
    No Refills
    Comments:
       Last Taken:5/16/18
             Time:8:30AM
 
 
Other Inst/Recommendations Stay clean&sober!  See PCP about medical condx's, 
labs, EKG.
Studies Pending at Discharge
None.
Copies To:
Intensive Outpt Psychiatry

## 2018-05-16 NOTE — CP SOUTH PROGRESS NOTE PSYCH
Psych (Inpt) Progress Note
Progress Note
Include the following elements, when applicable:
Involvement in the active treatment of the patient with behavioral observations 
of the patient and the patient's response to the treatment.
Review of the ongoing treatment process in the context of the treatment plan.
Indication of how multi-disciplinary staff members are carrying out the 
treatment plan.
Plans for future interventions and recommendations for revision of the treatment
plan.
Liaison with other physicians/providers.
Progress Note:
Case and treatment plan discussed in team meeting.  Staff reports that the 
patient is denying suicidal ideation.  Seems fine.  Has IOP intake at 11:30 AM.
 
Patient seen at 9:57 AM.  Reports he feels pretty good.  Has no complaints.  Not
interested in medication.  Affect is calm and blunted to euthymic.  Mood is 
good.  Rates sad mood and anxiety both 0/10.  Denies feeling hopeless, helpless,
worthless or guilty.  Denies active and passive suicidal ideation.  Denies 
homicidal ideation.  Denies auditory and visual hallucinations and paranoid 
ideation.  Describes sleep, appetite and energy as good.  Tolerating medications
well, without complaint.  Feels ready and safe for discharge.  Patient was 
advised to remain clean and sober.
 
IMPRESSION:
Condition improved.  Okay for discharge today to IOP intake, then to home and 
aunt.

## 2018-05-16 NOTE — SOCIAL WORKER PROG NOTE PSYCH
Social Work Progress Note
Faxed Referral(s)
   Referred To: [facility]
   Transition of Care Documents sent: Transfer 
   Faxed to: [facility]
   Fax #: [phone]
   Faxed by: mikhail moreno lcsw
   Date faxed: 05/16/18
   Time Faxed: [phone]

## 2018-05-16 NOTE — SOCIAL WORKER PROG NOTE PSYCH
Social Work Progress Note
Progress Note
This writer met with patient.  He reported a "good" mood and denied SI/HI/AH/VH.
 Patient reported feeling ready for discharge today and accepted the [facility] 
intake appointment scheduled for today at 11:30am.  Patient stated that a friend
will provide transportation home following the intake.  Patient stated that he 
also plans to return to  and attend 90 meetings in 90 days.  He identified a 
safety plan in which he would "call someone - my daughter, my sister."  He was 
also accepting of the crisis numbers and warm line numbers.  Patient was 
provided with the [facility] number and stated that he would call them to schedule a 
ride once he is provided with his IOP schedule.  In addition, he stated that he 
would ask a friend for transportation if he is scheduled to start IOP prior to 
being able to schedule the [facility] ride.  A message was relayed to Anne Farias at [facility] regarding patient's plan to utilize [facility].
Faxed Referral(s)
   Referred To: [facility]
   Transition of Care Documents sent: Health Summary
   Faxed to: [facility]
   Fax #: [phone]
   Faxed by: DORIE Thorne LCSW
   Date faxed: 05/16/18
   Time Faxed: [phone]

## 2018-05-16 NOTE — PATIENT DISCHARGE INSTRUCTIONS
Psych Discharge Inst
 
General Discharge Information
Reason for Admission:
While intoxicated, expressed suicidal ideation and reportedly was preparing to 
hang self.
Psy Discharge Primary Diag+ Unspecified depression
Psy Discharge Secondary Diag+ Alcohol use disorder Diabetes Hypertension 
Hyperlipidemia
Summary Tests/Major Procedures
 Lab
 
 
 U/L H 05/11/18 1055
 
AST 79 U/L H 05/11/18 1055
 
BUN 14 mg/dL 05/12/18 1620
 
Calcium 10.2 mg/dL 05/11/18 1055
 
Carbon Dioxide 27 mmol/L 05/12/18 1620
 
Chloride 102 mmol/L 05/12/18 1620
 
Cholesterol 158 MG/DL 05/12/18 1620
 
Cholesterol/HDL Ratio 4 % 05/12/18 1620
 
Creatinine 0.7 mg/dL 05/12/18 1620
 
Estimated GFR > 60 ml/min 05/12/18 1620
 
Glucose 143 mg/dL H 05/11/18 1055
 
HDL Cholesterol 36 mg/dL L 05/12/18 1620
 
LDL Cholesterol, Calc 73 mg/dL 05/12/18 1620
 
Potassium 4.3 mmol/L 05/12/18 1620
 
Sodium 141 mmol/L 05/12/18 1620
 
TSH &T3 &Free T4 Intrp 0.686 uIU/mL 05/12/18 1620
 
Total Bilirubin 0.2 mg/dL 05/11/18 1055
 
Triglycerides 248 mg/dL H 05/12/18 1620
 
Absolute Monocytes 0.8 /CUMM H 05/11/18 1055
 
Hct 46.0 % 05/11/18 1055
 
Hgb 15.3 G/DL 05/11/18 1055
 
MCH 34.3 PG H 05/11/18 1055
 
.0 FL H 05/11/18 1055
 
Monocytes % 10.1 % H 05/11/18 1055
 
Plt Count 383 /CUMM 05/11/18 1055
 
RBC 4.47 /CUMM L 05/11/18 1055
 
WBC 8.1 /CUMM 05/11/18 1055
 
Serum Alcohol 274.0 MG/DL 05/11/18 1055
 
 
EKG 5/12/18 showed sinus rhythm @ 84, probable inferior infarct, old, no change 
from prior tracing, abnormal EKG, , QTc 459.
Studies Pending at DC:
None.
 
Patient Instructions
Contact Information
Your Psychiatrist on [facility] was ZiTrey lucio MD
 
* If you are experiencing an emergency related to this hospitalization, please 
call [phone] to contact the treating psychiatrist or the psychiatrist-on-
call.
 
* To Request a copy of your medical records, please contact the Medical Records 
Department at [phone].
 
* To request results of studies pending at the time of discharge, please call 
[phone].
 
* Continue your Medications until directed to stop by your Healthcare provider.
 
General Medication Information
Please continue to take your new medications and your continued home medications
, unless otherwise indicated on your discharge medication list, or unless 
directed by your MD or APRN to stop them.
 
 
Special Instructions
Diet Diabetic
Activity Normal
Other Inst/Recommendations Stay clean&sober!  See PCP about medical condx's, 
labs, EKG.
- Tobacco Use Treatment Offered
Post DC Medications Offered: Script Given-See Med List
Post DC Tobacco Treatment Plan: [facility] Tobacco Tx Pgm
Program Appt Date: 05/16/18
Program Appt Time: 1600
- EtOH/Drug Use D/O Treatment Offered
Post DC Medications Offered: Ref Med EtOH/Drug Use D/O
Post DC EtOH/SubAbuse TX Plan: [facility] SubAbuse/Dual IOP
Program Appt Date: 05/16/18
Program Appt Time: 1130
Metabolic Screening
([x]) Not Applicable, patient not on a neuroleptic.
 
 OR
 
() Patient on a neuroleptic(s) .
     Enter below results for Hemoglobin A1C, 
     and lipid panel if obtained during the last 365 days.
 
BMI: 32.300     
 
Blood Pressure: 163/92
 
Laboratory Results From [facility] EHR (If applicable):
 
 
 
Advance Directives
Does the Patient have Medical Advance Directives No/Refused further info
Does Pt have Psychiatric Advance Directives? No/Refused further info
Does Patient have a Designated Surrogate Decision Maker: No
Information About Psychiatric Advance Directives Provided? Refused
 
Discharge Plan
Post Hospital Treatment Plan:
Returning to home.  Lives with aunt.